# Patient Record
Sex: FEMALE | Race: BLACK OR AFRICAN AMERICAN | URBAN - METROPOLITAN AREA
[De-identification: names, ages, dates, MRNs, and addresses within clinical notes are randomized per-mention and may not be internally consistent; named-entity substitution may affect disease eponyms.]

---

## 2018-02-05 ENCOUNTER — OFFICE VISIT (OUTPATIENT)
Dept: INTERNAL MEDICINE | Age: 48
End: 2018-02-05
Attending: STUDENT IN AN ORGANIZED HEALTH CARE EDUCATION/TRAINING PROGRAM

## 2018-02-05 VITALS
BODY MASS INDEX: 48.71 KG/M2 | HEIGHT: 61 IN | OXYGEN SATURATION: 98 % | DIASTOLIC BLOOD PRESSURE: 79 MMHG | TEMPERATURE: 98.1 F | SYSTOLIC BLOOD PRESSURE: 145 MMHG | WEIGHT: 258 LBS | HEART RATE: 102 BPM | RESPIRATION RATE: 16 BRPM

## 2018-02-05 DIAGNOSIS — R06.00 PND (PAROXYSMAL NOCTURNAL DYSPNEA): Primary | ICD-10-CM

## 2018-02-05 RX ORDER — PANTOPRAZOLE SODIUM 40 MG/1
40 TABLET, DELAYED RELEASE ORAL DAILY
Qty: 30 TABLET | Refills: 0 | Status: SHIPPED | OUTPATIENT
Start: 2018-02-05

## 2018-02-05 RX ORDER — ASPIRIN 325 MG
325 TABLET ORAL DAILY
COMMUNITY

## 2018-02-05 NOTE — PROGRESS NOTES
[facility] PROGRESS NOTE      February 5, 2018  Terry Esqueda    Chief Complaint:   Chief Complaint   Patient presents with    Follow-up     chest pain;headaches daily       Constitutional: alert and oriented; calm; states she is overweight. states one week ago fever,sore throat used Mucinex and symptoms resolved now. Eyes: negative  Ears, nose, mouth, throat, and face: negative  Respiratory: negative  Cardiovascular: states she has daily \"spasms/clenching pain\" in chest which is intermittent lasting a couple of minutes at the most and this pain is intermittent throughout the day  Gastrointestinal: negative  Genitourinary: negative  Integument/breast: negative  Hematologic/lymphatic: negative  Musculoskeletal: chronic neck pain  Neurological: hx for migraines; used to take Imitrex but now only uses Aspirin 325mg  prn; states she experiences daily am headaches relieved some with aspirin. she works from home on computer and starts work at 11:30am even with headache present. Diabetes: No  Pain Assessment:  Pain Present: no  Pain Score: 0  Pain Quality/Description: denies headache tamiko. Mobility/Safety/Self-Care:  Steady Gait: Yes  History of Falls: No   History of a Fall within the last 30 days No  Assistive Device: None  Poor Hygiene: No    Psychosocial:   Depression: No  If YES,    Does Patient express current thoughts of harming self or others?: No  Anxious: Yes; states she has taken Paxil in the past but stopped it due to 80lb. weight gain.   Insomnia: No  Inappropriate Behavior: No  Alcohol Abuse: no  Substance Abuse: no  Signs of Physical Abuse: No  Signs of Emotional Abuse: No    Educational:  Identify the learner who is being assessed for education:  patient                    Ability to Learn:  Exhibits ability to grasp concepts and respond to questions: High  Ready to Learn: Yes  calm   Preferred Method of Learning:  written  Barriers to Learning: Jenae interest  Special Considerations due to cultural, Latter day, spiritual beliefs:  No  Language:  English  :  No    Note:         9:20 AM 2/5/2018